# Patient Record
Sex: FEMALE | Employment: FULL TIME | URBAN - METROPOLITAN AREA
[De-identification: names, ages, dates, MRNs, and addresses within clinical notes are randomized per-mention and may not be internally consistent; named-entity substitution may affect disease eponyms.]

---

## 2020-01-06 ENCOUNTER — OFFICE VISIT (OUTPATIENT)
Dept: URGENT CARE | Facility: RETAIL CLINIC | Age: 20
End: 2020-01-06
Payer: COMMERCIAL

## 2020-01-06 VITALS
TEMPERATURE: 98 F | RESPIRATION RATE: 16 BRPM | DIASTOLIC BLOOD PRESSURE: 76 MMHG | SYSTOLIC BLOOD PRESSURE: 144 MMHG | OXYGEN SATURATION: 97 % | HEART RATE: 67 BPM

## 2020-01-06 DIAGNOSIS — H69.93 DYSFUNCTION OF BOTH EUSTACHIAN TUBES: Primary | ICD-10-CM

## 2020-01-06 PROCEDURE — 99203 OFFICE O/P NEW LOW 30 MIN: CPT | Performed by: PHYSICIAN ASSISTANT

## 2020-01-06 RX ORDER — FLUTICASONE PROPIONATE 50 MCG
2 SPRAY, SUSPENSION (ML) NASAL DAILY
Qty: 16 G | Refills: 0 | Status: SHIPPED | OUTPATIENT
Start: 2020-01-06

## 2020-01-06 RX ORDER — DESOGESTREL AND ETHINYL ESTRADIOL 0.15-0.03
1 KIT ORAL DAILY
COMMUNITY
End: 2020-01-06

## 2020-01-06 RX ORDER — DESOGESTREL AND ETHINYL ESTRADIOL 0.15-0.03
1 KIT ORAL DAILY
COMMUNITY

## 2020-01-06 NOTE — PATIENT INSTRUCTIONS
For middle ear fluid and eustachian tube dysfunction, no ear infection    1. Buy at the pharmacy - oral decongestant called Sudafed/Pseudophedrine that you can purchase from behind the pharmacist counter which helps to dry up fluid in the ears.  2. Use steroid nasal spray called Flonase (Fluticasone) 2 sprays in each nostril once a day.  3. Buy over the counter - Take oral antihistamine, such as Claritin/Loradatine/Blue Box or Zyrtec/Cetirizine/Green Box - Take 1 tablet once a day.   Continue using steroid nasal spray, taking oral antihistamine and oral decongestant for few days AFTER ear symptoms are resolved.  Place warm compress/heating pad next to ear 1-2 times daily for 15 minutes each time.  Drink plenty of fluids and place a humidifier in bedroom.  Perform auto-insufflation: puff out cheeks with air, plug nose and gently blow 5x in a row few times a day to help equalize ear pressure.  Chew gum, frequent yawning  Take Tylenol/Acetaminophen or Advil/Ibuprofen as needed for pain or fever.  Follow-up with your primary care provider in 10-14 days with any concern of persistent symptoms.        Earache, No Infection (Adult)  Earaches can happen without an infection. This occurs when air and fluid build up behind the eardrum causing a feeling of fullness and discomfort and reduced hearing. This is called otitis media with effusion (OME) or serous otitis media. It means there is fluid in the middle ear. It is not the same as acute otitis media, which is typically from infection.  OME can happen when you have a cold if congestion blocks the passage that drains the middle ear. This passage is called the eustachian tube. OME may also occur with nasal allergies or after a bacterial middle ear infection.    The pain or discomfort may come and go. You may hear clicking or popping sounds when you chew or swallow. You may feel that your balance is off. Or you may hear ringing in the ear.  It often takes from several weeks up  to 3 months for the fluid to clear on its own. Oral pain relievers and ear drops help if there is pain. Decongestants and antihistamines sometimes help. Antibiotics don't help since there is no infection. Your doctor may prescribe a nasal spray to help reduce swelling in the nose and eustachian tube. This can allow the ear to drain.  If your OME doesn't improve after 3 months, surgery may be used to drain the fluid and insert a small tube in the eardrum to allow continued drainage.  Because the middle ear fluid can become infected, it is important to watch for signs of an ear infection which may develop later. These signs include increased ear pain, fever, or drainage from the ear.  Home care  The following guidelines will help you care for yourself at home:    You may use over-the-counter medicine as directed to control pain, unless another medicine was prescribed. If you have chronic liver or kidney disease or ever had a stomach ulcer or GI bleeding, talk with your doctor before using these medicines. Aspirin should never be used in anyone under 18 years of age who is ill with a fever. It may cause severe liver damage.    You may use over-the-counter decongestants such as phenylephrine or pseudoephedrine. But they are not always helpful. Don't use nasal spray decongestants more than 3 days. Longer use can make congestion worse. Prescription nasal sprays from your doctor don't typically have those restrictions.    Antihistamines may help if you are also having allergy symptoms.    You may use medicines such as guaifenesin to thin mucus and promote drainage.  Follow-up care  Follow up with your healthcare provider or as advised if you are not feeling better after 3 days.  When to seek medical advice  Call your healthcare provider right away if any of the following occur:    Your ear pain gets worse or does not start to improve     Fever of 100.4 F (38 C) or higher, or as directed by your healthcare provider    Fluid  or blood draining from the ear    Headache or sinus pain    Stiff neck    Unusual drowsiness or confusion  Date Last Reviewed: 10/1/2016    [id number] [employer]. [address] [place] [address], [place] [address]. All rights reserved. This information is not intended as a substitute for professional medical care. Always follow your healthcare professional's instructions.

## 2020-01-07 NOTE — PROGRESS NOTES
Chief Complaint   Patient presents with     Ear Problem     Bilateral      SUBJECTIVE:  Lindsey Gannon is a 19 year old female who presents with bilateral ear pain for 1 week(s), now plugged/harder to hear  Severity: moderate   Timing:still present  Additional symptoms include congestion  History of recurrent otitis: no  No chronic health issues    No past medical history on file.  Current Outpatient Medications   Medication Sig Dispense Refill     desogestrel-ethinyl estradiol (APRI) 0.15-30 MG-MCG tablet Take 1 tablet by mouth daily       History   Smoking Status     Not on file   Smokeless Tobacco     Not on file       ROS:   CONSTITUTIONAL:NEGATIVE for fever, chills  ENT/MOUTH: POSITIVE for ear pain bilateral and NEGATIVE for sore throat  RESP:NEGATIVE for significant cough or wheezing    OBJECTIVE:  BP (!) 144/76   Pulse 67   Temp 98  F (36.7  C) (Temporal)   Resp 16   SpO2 97%   The right TM - dull, serous effusion, neutral position  The right auditory canal is normal and without drainage, edema or erythema  The left TM - dull, serous effusion, neutral position  The left auditory canal is normal and without drainage, edema or erythema  Oropharynx exam is normal: no lesions, erythema, adenopathy or exudate.  GENERAL: no acute distress  EYES: conjunctiva clear  NECK: supple, non-tender to palpation, no adenopathy noted  RESP: lungs clear to auscultation - no rales, rhonchi or wheezes  CV: regular rates and rhythm, normal S1 S2, no murmur noted  SKIN: no suspicious lesions or rashes     ASSESSMENT:  (H69.83) Dysfunction of both eustachian tubes  (primary encounter diagnosis)    PLAN:  Plan: fluticasone (FLONASE) 50 MCG/ACT nasal spray  For middle ear fluid and eustachian tube dysfunction, no ear infection    1. Buy at the pharmacy - oral decongestant called Sudafed/Pseudophedrine that you can purchase from behind the pharmacist counter which helps to dry up fluid in the ears.  2. Use steroid nasal spray  called Flonase (Fluticasone) 2 sprays in each nostril once a day.  3. Buy over the counter - Take oral antihistamine, such as Claritin/Loradatine/Blue Box or Zyrtec/Cetirizine/Green Box - Take 1 tablet once a day.   Continue using steroid nasal spray, taking oral antihistamine and oral decongestant for few days AFTER ear symptoms are resolved.  Place warm compress/heating pad next to ear 1-2 times daily for 15 minutes each time.  Drink plenty of fluids and place a humidifier in bedroom.  Perform auto-insufflation: puff out cheeks with air, plug nose and gently blow 5x in a row few times a day to help equalize ear pressure.  Chew gum, frequent yawning  Take Tylenol/Acetaminophen or Advil/Ibuprofen as needed for pain or fever.  Follow-up with your primary care provider in 10-14 days with any concern of persistent symptoms.        Virginia Umana PA-C  [facility]